# Patient Record
Sex: FEMALE | Race: WHITE
[De-identification: names, ages, dates, MRNs, and addresses within clinical notes are randomized per-mention and may not be internally consistent; named-entity substitution may affect disease eponyms.]

---

## 2019-03-28 ENCOUNTER — HOSPITAL ENCOUNTER (EMERGENCY)
Dept: HOSPITAL 39 - ER | Age: 29
Discharge: HOME | End: 2019-03-28
Payer: COMMERCIAL

## 2019-03-28 VITALS — DIASTOLIC BLOOD PRESSURE: 80 MMHG | SYSTOLIC BLOOD PRESSURE: 116 MMHG

## 2019-03-28 VITALS — OXYGEN SATURATION: 99 % | TEMPERATURE: 98.5 F

## 2019-03-28 DIAGNOSIS — L02.215: Primary | ICD-10-CM

## 2019-03-28 RX ADMIN — CEPHALEXIN ONE MG: 500 CAPSULE ORAL at 20:33

## 2019-03-28 RX ADMIN — KETOROLAC TROMETHAMINE ONE MG: 30 INJECTION, SOLUTION INTRAMUSCULAR at 20:33

## 2019-03-28 NOTE — ED.PDOC
History of Present Illness





- General


Chief Complaint: Skin/Abrasion/Tear


Stated Complaint: ingrown hair follicle


Time Seen by Provider: 03/28/19 20:11


Source: patient


Exam Limitations: no limitations





- History of Present Illness


Initial Comments: 


patient comes in today with 2-3 day history of worsening bump on the right 

portion of her mons.  The patient tried to open it herself but had on purulence 

drained.  Patient denies any vaginal discharge, fever, chills nausea or 

vomiting.  She's never had this problem before and has no past medical history. 

She has not taken anything for pain.





Timing/Duration: week, getting worse


Severity: moderate


Location: genitalia


Improving Factors: nothing


Worsening Factors: nothing


Associated Symptoms: denies symptoms


Allergies/Adverse Reactions: 


Allergies





NO KNOWN ALLERGY Allergy (Verified 09/26/16 08:27)


   








Home Medications: 


Ambulatory Orders





Ondansetron [Zofran Odt] 4 mg PO Q6HR PRN #10 tab 09/26/16 


Cephalexin Monohydrate [Keflex] 500 mg PO TID #30 cap 03/28/19 


Tramadol HCl [Ultram] 50 mg PO Q6HRS PRN #20 tab 03/28/19 











Review of Systems





- Review of Systems


Constitutional: States: no symptoms reported.  Denies: chills, fever, weakness


EENTM: States: no symptoms reported


Respiratory: States: no symptoms reported.  Denies: cough, short of breath


Cardiology: States: no symptoms reported.  Denies: chest pain, palpitations


Gastrointestinal/Abdominal: States: no symptoms reported


Genitourinary: States: no symptoms reported


Skin: States: see HPI





Past Medical History (General)





- Patient Medical History


Hx Seizures: No


Hx Stroke: No


Hx Dementia: No


Hx Asthma: No


Hx of COPD: No


Hx Cardiac Disorders: No


Hx Congestive Heart Failure: No


Hx Pacemaker: No


Hx Hypertension: No


Hx Thyroid Disease: No


Hx Diabetes: No


Hx Gastroesophageal Reflux: No


Hx Renal Disease: No


Hx Cancer: No


Hx of HIV: No


Hx Hepatitis C: No


Hx MRSA: No





- Vaccination History


Hx Tetanus, Diphtheria Vaccination: No


Hx Influenza Vaccination: No


Hx Pneumococcal Vaccination: No





- Social History


Hx Tobacco Use: No


Hx Chewing Tobacco Use: No


Hx Alcohol Use: Yes - occ


Hx Substance Use: No


Hx Substance Use Treatment: No


Hx Depression: No


Hx Physical Abuse: No


Hx Emotional Abuse: No


Hx Suspected Abuse: No





- Female History


Patient Pregnant: No





Family Medical History





- Family History


  ** Mother


Family History: No Known


Living Status: Still Living





Physical Exam





- Physical Exam


General Appearance: Alert, Comfortable, No apparent distress


Cardiovascular/Chest: normal peripheral pulses, regular rate, rhythm


Respiratory: chest non-tender, lungs clear, normal breath sounds


Skin Problem Location: other - 4 cm area of induration, erythema, with central 1

 cm of fluctulance right mons 





Procedures





- Incision and Drainage


  ** #1


Site: right mons


Procedure and Prep: pus drained, other - Hibicleanse prep 


Blade Size: 11


Procedure Comments: after area cleaned with hibicleanse, 2 cc of lidocaine 1% 

injected for local anesthesia.  11 blade used to open wound and thick purulence 

drained with small .5 cm tracking inferiorly.  [place] placed and patient 

tolerated procedure well





Departure





- Departure


Clinical Impression: 


 Abscess





Disposition: Discharge to Home or Self Care


Condition: Good


Departure Forms:  ED Discharge - Pt. Copy, Patient Portal Self Enrollment


Instructions:  DI for Abrasion


Referrals: 


Chantel Quan NP [Primary Care Provider] - 1-2 Weeks


Prescriptions: 


Tramadol HCl [Ultram] 50 mg PO Q6HRS PRN #20 tab


 PRN Reason: Pain


Cephalexin Monohydrate [Keflex] 500 mg PO TID #30 cap


Home Medications: 


Ambulatory Orders





Ondansetron [Zofran Odt] 4 mg PO Q6HR PRN #10 tab 09/26/16 


Cephalexin Monohydrate [Keflex] 500 mg PO TID #30 cap 03/28/19 


Tramadol HCl [Ultram] 50 mg PO Q6HRS PRN #20 tab 03/28/19 








Additional Instructions: 


follow up in am with PCP for wound repacking/recheck.  return to ER for 

increased pain, fever >100.5, sedation precautions with pain medication